# Patient Record
Sex: MALE | Race: WHITE | URBAN - METROPOLITAN AREA
[De-identification: names, ages, dates, MRNs, and addresses within clinical notes are randomized per-mention and may not be internally consistent; named-entity substitution may affect disease eponyms.]

---

## 2024-05-10 ENCOUNTER — HOSPITAL ENCOUNTER (OUTPATIENT)
Age: 49
Discharge: HOME | End: 2024-05-10
Payer: COMMERCIAL

## 2024-05-10 VITALS
TEMPERATURE: 99 F | HEART RATE: 51 BPM | DIASTOLIC BLOOD PRESSURE: 71 MMHG | SYSTOLIC BLOOD PRESSURE: 149 MMHG | OXYGEN SATURATION: 100 % | RESPIRATION RATE: 14 BRPM

## 2024-05-10 DIAGNOSIS — M54.32 SCIATICA OF LEFT SIDE: Primary | ICD-10-CM

## 2024-05-10 PROCEDURE — 99203 OFFICE O/P NEW LOW 30 MIN: CPT | Performed by: NURSE PRACTITIONER

## 2024-05-10 RX ORDER — PREDNISONE 20 MG/1
40 TABLET ORAL DAILY
Qty: 14 TABLET | Refills: 0 | Status: SHIPPED | OUTPATIENT
Start: 2024-05-10 | End: 2024-05-17

## 2024-05-10 RX ORDER — CYCLOBENZAPRINE HCL 10 MG
TABLET ORAL
COMMUNITY
Start: 2024-05-08

## 2024-05-10 NOTE — ED PROVIDER NOTES
Patient Seen in: [facility]      History     Chief Complaint   Patient presents with    Sciatica     Stated Complaint: Back Pain    Subjective:   HPI    48-year-old male presents to immediate care with complaints of left sciatic nerve pain.  He states it started 2 days ago after he was playing basketball.  He states he saw a \"pain doctor \"and was given a prescription for cyclobenzaprine.  Patient states he has taken it and has had no relief.  He is requesting pain medication.    Objective:   History reviewed. No pertinent past medical history.           History reviewed. No pertinent surgical history.             Social History     Socioeconomic History    Marital status:    Tobacco Use    Smoking status: Never     Passive exposure: Never    Smokeless tobacco: Never   Vaping Use    Vaping status: Never Used   Substance and Sexual Activity    Alcohol use: Never    Drug use: Never              Review of Systems    Positive for stated complaint: Back Pain  Other systems are as noted in HPI.  Constitutional and vital signs reviewed.      All other systems reviewed and negative except as noted above.    Physical Exam     ED Triage Vitals [05/10/24 1130]   /71   Pulse 51   Resp 14   Temp 98.6 °F (37 °C)   Temp src Temporal   SpO2 100 %   O2 Device None (Room air)       Current Vitals:   Vital Signs  BP: 149/71  Pulse: 51  Resp: 14  Temp: 98.6 °F (37 °C)  Temp src: Temporal    Oxygen Therapy  SpO2: 100 %  O2 Device: None (Room air)            Physical Exam  Vitals reviewed.   Constitutional:       General: He is not in acute distress.     Appearance: He is not ill-appearing.   HENT:      Nose: Nose normal.      Mouth/Throat:      Mouth: Mucous membranes are moist.   Cardiovascular:      Rate and Rhythm: Normal rate and regular rhythm.   Pulmonary:      Effort: Pulmonary effort is normal.      Breath sounds: Normal breath sounds.   Musculoskeletal:         General: Signs of injury present. No  swelling, tenderness or deformity.   Skin:     General: Skin is warm and dry.   Neurological:      General: No focal deficit present.      Mental Status: He is alert and oriented to person, place, and time.      Sensory: No sensory deficit.      Motor: No weakness.   Psychiatric:         Mood and Affect: Mood normal.         Behavior: Behavior normal.               ED Course   Labs Reviewed - No data to display                   MDM                                         Medical Decision Making  48-year-old male presents to immediate care with complaints of left-sided sciatic nerve pain.  Differential diagnosis includes sciatic nerve inflammation, disc herniation, muscle spasm.  Patient states he was playing basketball 2 days ago and developed sciatic nerve pain.  He also states he went to see a \"pain doctor \"and was given a prescription for Flexeril.  He states this has not been helping and he is requesting pain medication.  Patient is rude and vague about this doctor's visit.  Patient was informed that he would not be receiving a controlled substance for pain relief.  He was instructed to continue taking the Flexeril.  He was also given a prescription for prednisone.  He was informed he needs to follow-up with his primary care doctor if the pain is persistent.    Risk  OTC drugs.  Prescription drug management.        Disposition and Plan     Clinical Impression:  1. Sciatica of left side         Disposition:  Discharge  5/10/2024 11:46 am    Follow-up:  Julian Duncan  [address] [place] [facility] [address]  [phone]      If symptoms worsen          Medications Prescribed:  Discharge Medication List as of 5/10/2024 11:46 AM        START taking these medications    Details   predniSONE 20 MG Oral Tab Take 2 tablets (40 mg total) by mouth daily for 7 days., Normal, Disp-14 tablet, R-0

## 2024-05-10 NOTE — ED INITIAL ASSESSMENT (HPI)
Patient comes in due to a sciatica pain that is running down his L leg x3days. Was rx'd cyclobenzaprine has taken and is not helping